# Patient Record
Sex: MALE | Race: WHITE | NOT HISPANIC OR LATINO | Employment: FULL TIME | URBAN - METROPOLITAN AREA
[De-identification: names, ages, dates, MRNs, and addresses within clinical notes are randomized per-mention and may not be internally consistent; named-entity substitution may affect disease eponyms.]

---

## 2019-08-21 DIAGNOSIS — Z00.00 WELL ADULT EXAM: Primary | ICD-10-CM

## 2019-08-27 ENCOUNTER — HOSPITAL ENCOUNTER (OUTPATIENT)
Dept: NON INVASIVE DIAGNOSTICS | Facility: CLINIC | Age: 39
Discharge: HOME | End: 2019-08-27

## 2019-08-27 ENCOUNTER — HOSPITAL ENCOUNTER (OUTPATIENT)
Dept: ULTRASOUND IMAGING | Facility: HOSPITAL | Age: 39
Discharge: HOME | End: 2019-08-27

## 2019-08-27 ENCOUNTER — OFFICE VISIT (OUTPATIENT)
Dept: FAMILY MEDICINE CLINIC | Facility: CLINIC | Age: 39
End: 2019-08-27

## 2019-08-27 ENCOUNTER — APPOINTMENT (OUTPATIENT)
Dept: LAB | Facility: CLINIC | Age: 39
End: 2019-08-27

## 2019-08-27 ENCOUNTER — HOSPITAL ENCOUNTER (OUTPATIENT)
Dept: CT IMAGING | Facility: HOSPITAL | Age: 39
Discharge: HOME | End: 2019-08-27

## 2019-08-27 VITALS
HEIGHT: 74 IN | DIASTOLIC BLOOD PRESSURE: 82 MMHG | WEIGHT: 177.4 LBS | SYSTOLIC BLOOD PRESSURE: 122 MMHG | BODY MASS INDEX: 22.77 KG/M2 | HEART RATE: 66 BPM

## 2019-08-27 DIAGNOSIS — Z00.00 WELL ADULT EXAM: ICD-10-CM

## 2019-08-27 DIAGNOSIS — R17 SERUM TOTAL BILIRUBIN ELEVATED: ICD-10-CM

## 2019-08-27 DIAGNOSIS — Z00.00 WELL ADULT EXAM: Primary | ICD-10-CM

## 2019-08-27 LAB
25(OH)D3 SERPL-MCNC: 31.1 NG/ML (ref 30–100)
ALBUMIN SERPL BCP-MCNC: 4.1 G/DL (ref 3.5–5)
ALP SERPL-CCNC: 64 U/L (ref 46–116)
ALT SERPL W P-5'-P-CCNC: 25 U/L (ref 12–78)
ANION GAP SERPL CALCULATED.3IONS-SCNC: 9 MMOL/L (ref 4–13)
AST SERPL W P-5'-P-CCNC: 20 U/L (ref 5–45)
ATRIAL RATE: 51 BPM
BACTERIA UR QL AUTO: ABNORMAL /HPF
BASOPHILS # BLD AUTO: 0.02 THOUSANDS/ΜL (ref 0–0.1)
BASOPHILS NFR BLD AUTO: 0 % (ref 0–1)
BILIRUB SERPL-MCNC: 1.5 MG/DL (ref 0.2–1)
BILIRUB UR QL STRIP: NEGATIVE
BUN SERPL-MCNC: 14 MG/DL (ref 5–25)
CALCIUM SERPL-MCNC: 8.5 MG/DL (ref 8.3–10.1)
CHLORIDE SERPL-SCNC: 104 MMOL/L (ref 100–108)
CHOLEST SERPL-MCNC: 190 MG/DL (ref 50–200)
CLARITY UR: CLEAR
CO2 SERPL-SCNC: 26 MMOL/L (ref 21–32)
COLOR UR: YELLOW
CREAT SERPL-MCNC: 0.86 MG/DL (ref 0.6–1.3)
CRP SERPL HS-MCNC: 2.22 MG/L
EOSINOPHIL # BLD AUTO: 0.1 THOUSAND/ΜL (ref 0–0.61)
EOSINOPHIL NFR BLD AUTO: 2 % (ref 0–6)
ERYTHROCYTE [DISTWIDTH] IN BLOOD BY AUTOMATED COUNT: 11.9 % (ref 11.6–15.1)
EST. AVERAGE GLUCOSE BLD GHB EST-MCNC: 103 MG/DL
GFR SERPL CREATININE-BSD FRML MDRD: 109 ML/MIN/1.73SQ M
GLUCOSE P FAST SERPL-MCNC: 106 MG/DL (ref 65–99)
GLUCOSE UR STRIP-MCNC: NEGATIVE MG/DL
HBA1C MFR BLD: 5.2 % (ref 4.2–6.3)
HCT VFR BLD AUTO: 43.4 % (ref 36.5–49.3)
HDLC SERPL-MCNC: 65 MG/DL (ref 40–60)
HGB BLD-MCNC: 14.6 G/DL (ref 12–17)
HGB UR QL STRIP.AUTO: NEGATIVE
IMM GRANULOCYTES # BLD AUTO: 0.02 THOUSAND/UL (ref 0–0.2)
IMM GRANULOCYTES NFR BLD AUTO: 0 % (ref 0–2)
KETONES UR STRIP-MCNC: NEGATIVE MG/DL
LDLC SERPL CALC-MCNC: 111 MG/DL (ref 0–100)
LEUKOCYTE ESTERASE UR QL STRIP: NEGATIVE
LYMPHOCYTES # BLD AUTO: 1.71 THOUSANDS/ΜL (ref 0.6–4.47)
LYMPHOCYTES NFR BLD AUTO: 31 % (ref 14–44)
MCH RBC QN AUTO: 32.9 PG (ref 26.8–34.3)
MCHC RBC AUTO-ENTMCNC: 33.6 G/DL (ref 31.4–37.4)
MCV RBC AUTO: 98 FL (ref 82–98)
MONOCYTES # BLD AUTO: 0.29 THOUSAND/ΜL (ref 0.17–1.22)
MONOCYTES NFR BLD AUTO: 5 % (ref 4–12)
NEUTROPHILS # BLD AUTO: 3.42 THOUSANDS/ΜL (ref 1.85–7.62)
NEUTS SEG NFR BLD AUTO: 62 % (ref 43–75)
NITRITE UR QL STRIP: NEGATIVE
NON-SQ EPI CELLS URNS QL MICRO: ABNORMAL /HPF
NRBC BLD AUTO-RTO: 0 /100 WBCS
OTHER STN SPEC: ABNORMAL
P AXIS: 45 DEGREES
PH UR STRIP.AUTO: 7.5 [PH]
PLATELET # BLD AUTO: 233 THOUSANDS/UL (ref 149–390)
PMV BLD AUTO: 8.4 FL (ref 8.9–12.7)
POTASSIUM SERPL-SCNC: 3.9 MMOL/L (ref 3.5–5.3)
PR INTERVAL: 146 MS
PROT SERPL-MCNC: 7.3 G/DL (ref 6.4–8.2)
PROT UR STRIP-MCNC: NEGATIVE MG/DL
PSA SERPL-MCNC: 0.6 NG/ML (ref 0–4)
QRS AXIS: 85 DEGREES
QRSD INTERVAL: 108 MS
QT INTERVAL: 444 MS
QTC INTERVAL: 409 MS
RBC # BLD AUTO: 4.44 MILLION/UL (ref 3.88–5.62)
RBC #/AREA URNS AUTO: ABNORMAL /HPF
SODIUM SERPL-SCNC: 139 MMOL/L (ref 136–145)
SP GR UR STRIP.AUTO: <=1.005 (ref 1–1.03)
T WAVE AXIS: 50 DEGREES
TRIGL SERPL-MCNC: 72 MG/DL
TSH SERPL DL<=0.05 MIU/L-ACNC: 1.34 UIU/ML (ref 0.36–3.74)
UROBILINOGEN UR QL STRIP.AUTO: 0.2 E.U./DL
VENTRICULAR RATE: 51 BPM
WBC # BLD AUTO: 5.56 THOUSAND/UL (ref 4.31–10.16)
WBC #/AREA URNS AUTO: ABNORMAL /HPF

## 2019-08-27 PROCEDURE — 93350 STRESS TTE ONLY: CPT

## 2019-08-27 PROCEDURE — 80061 LIPID PANEL: CPT

## 2019-08-27 PROCEDURE — 99499EX: Performed by: FAMILY MEDICINE

## 2019-08-27 PROCEDURE — 83036 HEMOGLOBIN GLYCOSYLATED A1C: CPT

## 2019-08-27 PROCEDURE — 84153 ASSAY OF PSA TOTAL: CPT

## 2019-08-27 PROCEDURE — 93010 ELECTROCARDIOGRAM REPORT: CPT | Performed by: INTERNAL MEDICINE

## 2019-08-27 PROCEDURE — 81001 URINALYSIS AUTO W/SCOPE: CPT

## 2019-08-27 PROCEDURE — 84443 ASSAY THYROID STIM HORMONE: CPT

## 2019-08-27 PROCEDURE — 82306 VITAMIN D 25 HYDROXY: CPT

## 2019-08-27 PROCEDURE — 80053 COMPREHEN METABOLIC PANEL: CPT

## 2019-08-27 PROCEDURE — 93922 UPR/L XTREMITY ART 2 LEVELS: CPT

## 2019-08-27 PROCEDURE — 36415 COLL VENOUS BLD VENIPUNCTURE: CPT

## 2019-08-27 PROCEDURE — 93351 STRESS TTE COMPLETE: CPT | Performed by: INTERNAL MEDICINE

## 2019-08-27 PROCEDURE — 93306 TTE W/DOPPLER COMPLETE: CPT

## 2019-08-27 PROCEDURE — 86141 C-REACTIVE PROTEIN HS: CPT

## 2019-08-27 PROCEDURE — 75571 CT HRT W/O DYE W/CA TEST: CPT

## 2019-08-27 PROCEDURE — 93306 TTE W/DOPPLER COMPLETE: CPT | Performed by: INTERNAL MEDICINE

## 2019-08-27 PROCEDURE — 76700 US EXAM ABDOM COMPLETE: CPT

## 2019-08-27 PROCEDURE — 93005 ELECTROCARDIOGRAM TRACING: CPT

## 2019-08-27 PROCEDURE — 85025 COMPLETE CBC W/AUTO DIFF WBC: CPT

## 2019-08-27 NOTE — PROGRESS NOTES
[facility] Physical Exam    Subjective:      Mr Maya Nguyễn is a 44year old gentleman who presents for a [facility]'s [facility] exam today  This is his first visit to our program  He is Head of Corporate Development at [employer]  Review of Systems   Constitutional: Negative  HENT: Negative  Respiratory: Negative  Cardiovascular: Negative  Gastrointestinal: Negative  Genitourinary: Negative  Musculoskeletal: Negative  Active Ambulatory Problems     Diagnosis Date Noted    Serum total bilirubin elevated 08/27/2019     Resolved Ambulatory Problems     Diagnosis Date Noted    No Resolved Ambulatory Problems     No Additional Past Medical History       History reviewed  No pertinent surgical history  History reviewed  No pertinent family history      Social History     Socioeconomic History    Marital status: /Civil Union     Spouse name: Not on file    Number of children: Not on file    Years of education: Not on file    Highest education level: Not on file   Occupational History    Not on file   Social Needs    Financial resource strain: Not on file    Food insecurity:     Worry: Not on file     Inability: Not on file    Transportation needs:     Medical: Not on file     Non-medical: Not on file   Tobacco Use    Smoking status: Not on file   Substance and Sexual Activity    Alcohol use: Not on file    Drug use: Not on file    Sexual activity: Not on file   Lifestyle    Physical activity:     Days per week: Not on file     Minutes per session: Not on file    Stress: Not on file   Relationships    Social connections:     Talks on phone: Not on file     Gets together: Not on file     Attends Yarsanism service: Not on file     Active member of club or organization: Not on file     Attends meetings of clubs or organizations: Not on file     Relationship status: Not on file    Intimate partner violence:     Fear of current or ex partner: Not on file Emotionally abused: Not on file     Physically abused: Not on file     Forced sexual activity: Not on file   Other Topics Concern    Not on file   Social History Narrative    Not on file       No current outpatient medications on file  Allergies no known allergies     Hearing Screening    Method: Audiometry    125Hz 250Hz 500Hz 1000Hz 2000Hz 3000Hz 4000Hz 6000Hz 8000Hz   Right ear:  5 0 5 5  5  0   Left ear:  5 0 5 10  5  10   Comments: HEARING EVALUATION    Name:  Willie Rendon  :  1980  Age:  44 y o  Date of Evaluation: 19     History: [facility] Exam  Reason for visit: Willie Rendon is being seen today at the request of Dr Bipin Ward available for an evaluation of hearing  Patient denies otalgia, otorrhea, dizziness, fullness, and tinnitus  Patient denies a family history of hearing loss and noise exposure  EVALUATION:    Otoscopic Evaluation:   Right Ear: Clear and healthy ear canal and tympanic membrane   Left Ear: Clear and healthy ear canal and tympanic membrane    Tympanometry:   Right: Type A - normal middle ear pressure and compliance   Left: Type A - normal middle ear pressure and compliance    Audiogram Results:  Pure tone testing revealed normal hearing sensitivity bilaterally  SRT and PTA are in agreement indicating good test reliability  Word recognition scores were  excellent bilaterally  *see attached audiogram      RECOMMENDATIONS:  Annual hearing eval, Return to [facility]  for F/U and Copy to Patient/Caregiver    PATIENT EDUCATION:   Discussed results and recommendations with patient  Questions were addressed and the patient was encouraged to contact our department should concerns arise  Shanna Stuart , CCC-A  Clinical Audiologist       Visual Acuity Screening    Right eye Left eye Both eyes   Without correction: 20/13 20/13 20/13   With correction:          Objective:     Physical Exam   Constitutional: He is oriented to person, place, and time   He appears well-developed and well-nourished  HENT:   Head: Normocephalic and atraumatic  Right Ear: External ear normal    Left Ear: External ear normal    Mouth/Throat: Oropharynx is clear and moist    Eyes: Pupils are equal, round, and reactive to light  Neck: Normal range of motion  Neck supple  Cardiovascular: Normal rate, regular rhythm and normal heart sounds  Pulmonary/Chest: Effort normal and breath sounds normal    Abdominal: Soft  Bowel sounds are normal    Neurological: He is alert and oriented to person, place, and time  He has normal reflexes  Psychiatric: He has a normal mood and affect  His behavior is normal  Judgment and thought content normal    Nursing note and vitals reviewed  Vitals:    08/27/19 1036   BP: 122/82   BP Location: Right arm   Pulse: 66   Weight: 80 5 kg (177 lb 6 4 oz)   Height: 6' 2" (1 88 m)       Assessment/Plan:    Diagnoses and all orders for this visit:    Well adult exam    Serum total bilirubin elevated           [facility] Summary:      Here on the findings from today's exam:    * SEE CARDIOLOGY NOTE FROM DR RANDALL REGARDING CARDIAC TESTING RESULTS    1) Elevated bilirubin level: Your serum bilirubin level was mildly elevated today  Your mentioned that you were told this in the past  The ultrasound of your liver did not show any abnormalities  This mild elevation is most likely due to a condition known as Gilbert's Syndrome  This is a benign condition which causes mild harmless elevations in bilirubin to occur  I would recommend yearly bilirubin levels, otherwise no further workup is needed at this time  2) Family History of Colon Cancer: Your father passed away from colon cancer at the age of 52  Fortunately, you are undergoing regular routine colonoscopies  I would recommend that you continue to followup with your gastroenterologist as directed  Thank you for choosing [facility]    It was a pleasure to meet and get to know you today  If you have any questions regarding today's exam, please feel free to contact me directly  Darius Zamarripa ([id number] Physician)  [phone] (cell)  [email or web address]

## 2019-08-27 NOTE — PROGRESS NOTES
Hearing Assessment Summary:     Hearing Screening    Method: Audiometry    125Hz 250Hz 500Hz 1000Hz 2000Hz 3000Hz 4000Hz 6000Hz 8000Hz   Right ear:  5 0 5 5  5  0   Left ear:  5 0 5 10  5  10   Comments: HEARING EVALUATION    Name:  Sharita Taylor  :  1980  Age:  44 y o  Date of Evaluation: 19     History: [facility] Exam  Reason for visit: Sharita Taylor is being seen today at the request of Dr Montrell Gil available for an evaluation of hearing  Patient denies otalgia, otorrhea, dizziness, fullness, and tinnitus  Patient denies a family history of hearing loss and noise exposure  EVALUATION:    Otoscopic Evaluation:   Right Ear: Clear and healthy ear canal and tympanic membrane   Left Ear: Clear and healthy ear canal and tympanic membrane    Tympanometry:   Right: Type A - normal middle ear pressure and compliance   Left: Type A - normal middle ear pressure and compliance    Audiogram Results:  Pure tone testing revealed normal hearing sensitivity bilaterally  SRT and PTA are in agreement indicating good test reliability  Word recognition scores were  excellent bilaterally  *see attached audiogram      RECOMMENDATIONS:  Annual hearing eval, Return to [facility]  for F/U and Copy to Patient/Caregiver    PATIENT EDUCATION:   Discussed results and recommendations with patient  Questions were addressed and the patient was encouraged to contact our department should concerns arise        Shanna Berkowitz , CCC-A  Clinical Audiologist       Visual Acuity Screening    Right eye Left eye Both eyes   Without correction: 20/13 20/13 20/13   With correction:

## 2019-08-27 NOTE — PROGRESS NOTES
HEART AND VASCULAR SUMMARY    1  Lipids:  ,  , HDL 65 , TG 72    2  ECG: Normal    3  Echocardiogram: Normal    4  Stress ECHO: Normal    5  Coronary Calcium CT: Score = 0  Normal     6  ASCVD risk score: Low risk  7  HSCRP: 2 2 which is average risk  Impression and Recommendations:    1  Low cardiovascular risk  2  Continue healthy lifestyle with exercise

## 2019-08-27 NOTE — PROGRESS NOTES
Nutritional Summary and Recommendations:   Jose Watts presents for nutrition assessment and counseling  Discussion focused on adjusting current dietary patterns,  body composition goals in relation to fat mass, increasing plant based foods at all meals as discussed with Dietitian and initiating Vitamin D 3 supplementation to ensure normal serum levels especially during Fall/Winter months  HT: 74", WT: 196 2lb, BMI 25 2, [place] BMR 1971 kcal, Fat Mass 38 6lb,  6lb, Personal Desired Fat Mass 34 lb  Meds: reviewed  Labs: Vitamin D 31 1, Chol 190, Trig 72, HDL 65, , HgBA1C 5 2, Glu 106  Nutrition Diagnoses: no over nutrition diagnoses at this review    Goals:   1  Achieve personal fat mass goal 34 lb within 12 months or next body compostion reading     2  Initiate 2000 IU Vitamin D3 daily, may want to increase to 4000 IU during Fall/Winter months to achieve Vit D level >30 year round  3  Plan meals choosing lean protein or plant based protein then vegetable choices and finally a starch choice  (Starch includes potatoes/corn/peas)  4  Consume 2 serving fruit and 4-6 Cups vegetables daily  5  Be mindful of increased stress events that can increase stress hormone Cortisol levels and possibly interfere with your body composition goals  6  Contact RD with any questions or concerns    Perceived Comprehension:   Very Good                                        Expected Compliance: Very Good

## 2019-08-27 NOTE — PROGRESS NOTES
Fitness Summary and Recommendations:  Martin Fisher scored at the 45% on his body composition assessment with a bodyfat % of 19 7%  Martin Fisher scored in the average range for flexibility with a sit & reach score of 28 cm  His Muscle Strength/Endurance scores placed him at the 60% (lower body) and 80% (upper body) with a chair stand score of 30 and arm curl score of 28 respectively  Jean-Claudes Cardiovascular Score of 58 3 placed him at the 95%  Overall Martin Fisher would be considered to have an above average fitness level with an 67% score  Continued emphasis on regular exercise and sound nutrition practices will enable Martin Fisher to reach his optimal level of fitness